# Patient Record
Sex: MALE | Race: ASIAN | Employment: STUDENT | URBAN - METROPOLITAN AREA
[De-identification: names, ages, dates, MRNs, and addresses within clinical notes are randomized per-mention and may not be internally consistent; named-entity substitution may affect disease eponyms.]

---

## 2019-12-24 ENCOUNTER — APPOINTMENT (OUTPATIENT)
Dept: CT IMAGING | Facility: CLINIC | Age: 29
End: 2019-12-24
Attending: FAMILY MEDICINE
Payer: COMMERCIAL

## 2019-12-24 ENCOUNTER — HOSPITAL ENCOUNTER (EMERGENCY)
Facility: CLINIC | Age: 29
Discharge: HOME | End: 2019-12-25
Attending: FAMILY MEDICINE | Admitting: FAMILY MEDICINE
Payer: COMMERCIAL

## 2019-12-24 DIAGNOSIS — R10.31 RLQ ABDOMINAL PAIN: ICD-10-CM

## 2019-12-24 LAB
ALBUMIN SERPL-MCNC: 4 G/DL (ref 3.4–5)
ALBUMIN UR-MCNC: NEGATIVE MG/DL
ALP SERPL-CCNC: 70 U/L (ref 40–150)
ALT SERPL W P-5'-P-CCNC: 27 U/L (ref 0–70)
ANION GAP SERPL CALCULATED.3IONS-SCNC: 2 MMOL/L (ref 3–14)
APPEARANCE UR: ABNORMAL
AST SERPL W P-5'-P-CCNC: 28 U/L (ref 0–45)
BASOPHILS # BLD AUTO: 0 10E9/L (ref 0–0.2)
BASOPHILS NFR BLD AUTO: 0.2 %
BILIRUB SERPL-MCNC: 0.5 MG/DL (ref 0.2–1.3)
BILIRUB UR QL STRIP: NEGATIVE
BUN SERPL-MCNC: 14 MG/DL (ref 7–30)
CALCIUM SERPL-MCNC: 8.9 MG/DL (ref 8.5–10.1)
CAOX CRY #/AREA URNS HPF: ABNORMAL /HPF
CHLORIDE SERPL-SCNC: 108 MMOL/L (ref 94–109)
CO2 SERPL-SCNC: 31 MMOL/L (ref 20–32)
COLOR UR AUTO: YELLOW
CREAT SERPL-MCNC: 1.14 MG/DL (ref 0.66–1.25)
DIFFERENTIAL METHOD BLD: ABNORMAL
EOSINOPHIL # BLD AUTO: 0.4 10E9/L (ref 0–0.7)
EOSINOPHIL NFR BLD AUTO: 2.9 %
ERYTHROCYTE [DISTWIDTH] IN BLOOD BY AUTOMATED COUNT: 12.3 % (ref 10–15)
GFR SERPL CREATININE-BSD FRML MDRD: 88 ML/MIN/{1.73_M2}
GLUCOSE SERPL-MCNC: 108 MG/DL (ref 70–99)
GLUCOSE UR STRIP-MCNC: NEGATIVE MG/DL
HCT VFR BLD AUTO: 48.5 % (ref 40–53)
HGB BLD-MCNC: 15.9 G/DL (ref 13.3–17.7)
HGB UR QL STRIP: NEGATIVE
IMM GRANULOCYTES # BLD: 0 10E9/L (ref 0–0.4)
IMM GRANULOCYTES NFR BLD: 0.2 %
KETONES UR STRIP-MCNC: NEGATIVE MG/DL
LEUKOCYTE ESTERASE UR QL STRIP: NEGATIVE
LIPASE SERPL-CCNC: 148 U/L (ref 73–393)
LYMPHOCYTES # BLD AUTO: 3.4 10E9/L (ref 0.8–5.3)
LYMPHOCYTES NFR BLD AUTO: 27 %
MCH RBC QN AUTO: 32.4 PG (ref 26.5–33)
MCHC RBC AUTO-ENTMCNC: 32.8 G/DL (ref 31.5–36.5)
MCV RBC AUTO: 99 FL (ref 78–100)
MONOCYTES # BLD AUTO: 0.8 10E9/L (ref 0–1.3)
MONOCYTES NFR BLD AUTO: 6.7 %
MUCOUS THREADS #/AREA URNS LPF: PRESENT /LPF
NEUTROPHILS # BLD AUTO: 7.9 10E9/L (ref 1.6–8.3)
NEUTROPHILS NFR BLD AUTO: 63 %
NITRATE UR QL: NEGATIVE
NRBC # BLD AUTO: 0 10*3/UL
NRBC BLD AUTO-RTO: 0 /100
PH UR STRIP: 6 PH (ref 5–7)
PLATELET # BLD AUTO: 223 10E9/L (ref 150–450)
POTASSIUM SERPL-SCNC: 4.1 MMOL/L (ref 3.4–5.3)
PROT SERPL-MCNC: 6.9 G/DL (ref 6.8–8.8)
RBC # BLD AUTO: 4.9 10E12/L (ref 4.4–5.9)
RBC #/AREA URNS AUTO: 3 /HPF (ref 0–2)
SODIUM SERPL-SCNC: 141 MMOL/L (ref 133–144)
SOURCE: ABNORMAL
SP GR UR STRIP: 1.02 (ref 1–1.03)
UROBILINOGEN UR STRIP-MCNC: NORMAL MG/DL (ref 0–2)
WBC # BLD AUTO: 12.5 10E9/L (ref 4–11)
WBC #/AREA URNS AUTO: 1 /HPF (ref 0–5)

## 2019-12-24 PROCEDURE — 83690 ASSAY OF LIPASE: CPT | Performed by: FAMILY MEDICINE

## 2019-12-24 PROCEDURE — 96361 HYDRATE IV INFUSION ADD-ON: CPT

## 2019-12-24 PROCEDURE — 85025 COMPLETE CBC W/AUTO DIFF WBC: CPT | Performed by: FAMILY MEDICINE

## 2019-12-24 PROCEDURE — 25800030 ZZH RX IP 258 OP 636: Performed by: FAMILY MEDICINE

## 2019-12-24 PROCEDURE — 80053 COMPREHEN METABOLIC PANEL: CPT | Performed by: FAMILY MEDICINE

## 2019-12-24 PROCEDURE — 74177 CT ABD & PELVIS W/CONTRAST: CPT

## 2019-12-24 PROCEDURE — 25000125 ZZHC RX 250: Performed by: FAMILY MEDICINE

## 2019-12-24 PROCEDURE — 96375 TX/PRO/DX INJ NEW DRUG ADDON: CPT

## 2019-12-24 PROCEDURE — 81001 URINALYSIS AUTO W/SCOPE: CPT | Performed by: FAMILY MEDICINE

## 2019-12-24 PROCEDURE — 25000128 H RX IP 250 OP 636: Performed by: FAMILY MEDICINE

## 2019-12-24 PROCEDURE — 99284 EMERGENCY DEPT VISIT MOD MDM: CPT | Mod: Z6 | Performed by: FAMILY MEDICINE

## 2019-12-24 PROCEDURE — 99285 EMERGENCY DEPT VISIT HI MDM: CPT | Mod: 25

## 2019-12-24 PROCEDURE — 96374 THER/PROPH/DIAG INJ IV PUSH: CPT | Mod: 59

## 2019-12-24 RX ORDER — HYDROMORPHONE HCL/0.9% NACL/PF 0.2MG/0.2
0.2 SYRINGE (ML) INTRAVENOUS ONCE
Status: COMPLETED | OUTPATIENT
Start: 2019-12-24 | End: 2019-12-25

## 2019-12-24 RX ORDER — SODIUM CHLORIDE 9 MG/ML
INJECTION, SOLUTION INTRAVENOUS ONCE
Status: COMPLETED | OUTPATIENT
Start: 2019-12-24 | End: 2019-12-24

## 2019-12-24 RX ORDER — IOPAMIDOL 755 MG/ML
100 INJECTION, SOLUTION INTRAVASCULAR ONCE
Status: COMPLETED | OUTPATIENT
Start: 2019-12-24 | End: 2019-12-24

## 2019-12-24 RX ADMIN — PROCHLORPERAZINE EDISYLATE 10 MG: 5 INJECTION INTRAMUSCULAR; INTRAVENOUS at 22:06

## 2019-12-24 RX ADMIN — SODIUM CHLORIDE: 9 INJECTION, SOLUTION INTRAVENOUS at 22:06

## 2019-12-24 RX ADMIN — SODIUM CHLORIDE 62 ML: 9 INJECTION, SOLUTION INTRAVENOUS at 23:40

## 2019-12-24 RX ADMIN — IOPAMIDOL 89 ML: 755 INJECTION, SOLUTION INTRAVENOUS at 23:37

## 2019-12-24 ASSESSMENT — MIFFLIN-ST. JEOR: SCORE: 1813.6

## 2019-12-24 NOTE — ED AVS SNAPSHOT
[facility], Emergency Department  [address]  [place] [address]  Phone:  [phone]  Fax:  [phone]                                    Mary Mcclain   MRN: [id number]    Department:  [facility], Emergency Department   Date of Visit:  12/24/2019           After Visit Summary Signature Page    I have received my discharge instructions, and my questions have been answered. I have discussed any challenges I see with this plan with the nurse or doctor.    ..........................................................................................................................................  Patient/Patient Representative Signature      ..........................................................................................................................................  Patient Representative Print Name and Relationship to Patient    ..................................................               ................................................  Date                                   Time    ..........................................................................................................................................  Reviewed by Signature/Title    ...................................................              ..............................................  Date                                               Time          22EPIC Rev 08/18

## 2019-12-25 VITALS
WEIGHT: 180 LBS | DIASTOLIC BLOOD PRESSURE: 79 MMHG | BODY MASS INDEX: 25.2 KG/M2 | TEMPERATURE: 97.5 F | HEIGHT: 71 IN | SYSTOLIC BLOOD PRESSURE: 123 MMHG | RESPIRATION RATE: 16 BRPM | HEART RATE: 75 BPM | OXYGEN SATURATION: 97 %

## 2019-12-25 PROCEDURE — 25000128 H RX IP 250 OP 636: Performed by: FAMILY MEDICINE

## 2019-12-25 RX ORDER — PROCHLORPERAZINE MALEATE 10 MG
10 TABLET ORAL EVERY 6 HOURS PRN
Qty: 6 TABLET | Refills: 0 | Status: SHIPPED | OUTPATIENT
Start: 2019-12-25

## 2019-12-25 RX ADMIN — Medication 0.2 MG: at 00:10

## 2019-12-25 ASSESSMENT — ENCOUNTER SYMPTOMS
CONSTIPATION: 0
HEMATOLOGIC/LYMPHATIC NEGATIVE: 1
NAUSEA: 0
SHORTNESS OF BREATH: 0
FEVER: 0
DIAPHORESIS: 0
DYSURIA: 0
MYALGIAS: 0
CHILLS: 0
FLANK PAIN: 0
SORE THROAT: 0
DIARRHEA: 0
COUGH: 0
VOMITING: 0
ABDOMINAL PAIN: 1
FREQUENCY: 0
FATIGUE: 0

## 2019-12-25 NOTE — ED PROVIDER NOTES
"    [facility] EMERGENCY DEPARTMENT ([facility])    12/24/19       History     Chief Complaint   Patient presents with     Abdominal Pain     generalized abdominal cramping which started today around 10 am, + nausea, no vomiting     HPI  Mary Mcclain is a 29 year old male who presents to the Emergency Department for abdominal cramping/ pain diffuse and nausea. This began today. No n or v. No f or c. No dysuria, no diarrhea. He has eaten today. Pt reports no hx of constipation. Last evening he ate \"[place] food\"- friend not ill today.    I have reviewed the Medications, Allergies, Past Medical and Surgical History, and Social History in the Epic system.  Past hx is benign. There is one intoxication from etoh in records  He reports no medications  Denies street drugs and no recent etoh  No tobacco    History reviewed. No pertinent past medical history.    History reviewed. No pertinent surgical history.    History reviewed. No pertinent family history.    Social History     Tobacco Use     Smoking status: Never Smoker     Smokeless tobacco: Never Used   Substance Use Topics     Alcohol use: Not Currently     Comment: last 2 months       No current facility-administered medications for this encounter.      Current Outpatient Medications   Medication     prochlorperazine (COMPAZINE) 10 MG tablet      No Known Allergies     Review of Systems   Constitutional: Negative for chills, diaphoresis, fatigue and fever.   HENT: Negative for congestion and sore throat.    Respiratory: Negative for cough and shortness of breath.    Cardiovascular: Negative for chest pain.   Gastrointestinal: Positive for abdominal pain. Negative for constipation, diarrhea, nausea and vomiting.   Genitourinary: Negative for dysuria, flank pain, frequency, scrotal swelling and testicular pain.   Musculoskeletal: Negative for myalgias.   Skin: Negative.    Hematological: Negative.        Physical Exam   BP: 126/78  Pulse: 62  Temp: 97.5 " " F (36.4  C)  Resp: 18  Height: 180.3 cm (5' 11\")  Weight: 81.6 kg (180 lb)  SpO2: 100 %      Physical Exam  Vitals signs and nursing note reviewed.   Constitutional:       General: He is not in acute distress.     Appearance: He is well-developed. He is not ill-appearing, toxic-appearing or diaphoretic.   HENT:      Head: Normocephalic and atraumatic.   Cardiovascular:      Rate and Rhythm: Normal rate and regular rhythm.      Heart sounds: Normal heart sounds.   Pulmonary:      Effort: Pulmonary effort is normal. No respiratory distress.      Breath sounds: Normal breath sounds.   Abdominal:      Comments: The pt has a non surgical abd  There is diffuse tenderness greater lower the high and greater right than left  No hs ilan   Skin:     General: Skin is warm and dry.   Neurological:      General: No focal deficit present.      Mental Status: He is alert and oriented to person, place, and time.   Psychiatric:         Behavior: Behavior normal.         ED Course        Procedures        The pt got compazine IV. This allowed for sleep. On awakening- same exam and  Complaints. Small amount of dilaudid then used  The WBC was minimally elevated. Chemistries are normal  UA normal  I elected to obtain CT looking for appy\  CT is normal showing no cause for pain. No appy! There is moderate amount of stool.  Immediately before discharge exam is improved with less discomfort.    Will discharge pt with compazine and instructions to return in 12 hours if not fully better. This is a possible early appy - no changes on ct.  Constipation is also a possibility    Recheck in 12 hours if not significantly better.  Clear liquids incase return needed    At this point  Labs Ordered and Resulted from Time of ED Arrival Up to the Time of Departure from the ED   CBC WITH PLATELETS DIFFERENTIAL - Abnormal; Notable for the following components:       Result Value    WBC 12.5 (*)     All other components within normal limits   COMPREHENSIVE " METABOLIC PANEL - Abnormal; Notable for the following components:    Anion Gap 2 (*)     Glucose 108 (*)     All other components within normal limits   UA MACROSCOPIC WITH REFLEX TO MICRO AND CULTURE - Abnormal; Notable for the following components:    RBC Urine 3 (*)     Mucous Urine Present (*)     Calcium Oxalate Few (*)     All other components within normal limits   LIPASE            Assessments & Plan (with Medical Decision Making)       I have reviewed the nursing notes.    I have reviewed the findings, diagnosis, plan and need for follow up with the patient.    Discharge Medication List as of 12/25/2019 12:45 AM      START taking these medications    Details   prochlorperazine (COMPAZINE) 10 MG tablet Take 1 tablet (10 mg) by mouth every 6 hours as needed for nausea or vomiting (and to help relax), Disp-6 tablet, R-0, Local Print             Final diagnoses:   RLQ abdominal pain       12/24/2019   [facility], [place], EMERGENCY DEPARTMENT     Collin Hendrickson MD  12/25/19 2322

## 2019-12-25 NOTE — DISCHARGE INSTRUCTIONS
Your ct scan looks normal. There is a moderate amount of stool in your colon  Your blood work is not remarkable  I do not have a cause for your abdominal pain.  Tonight and tomorrow- clear liquids only- water, gator aid or 7 up.   For the pain, 2 advil every 4 hours or two 325 mg tylenol every 6 hours  If needed to help relax or nausea compazine  If not 100% better in 12 hours return for a recheck to the emergency room  If 100 % better- begin a dose of milk of magnesia each day for 3 days- 1 oz daily- this to help move the stool- it is possible your pain is stool related    If fevers greater than 100.5, vomiting or worse pain return asap before 12 hours

## 2020-03-11 ENCOUNTER — OFFICE VISIT (OUTPATIENT)
Dept: ORTHOPEDICS | Facility: CLINIC | Age: 30
End: 2020-03-11
Payer: COMMERCIAL

## 2020-03-11 VITALS — HEIGHT: 71 IN | WEIGHT: 180 LBS | BODY MASS INDEX: 25.2 KG/M2

## 2020-03-11 DIAGNOSIS — M89.8X1 PERISCAPULAR PAIN: Primary | ICD-10-CM

## 2020-03-11 RX ORDER — TIZANIDINE 2 MG/1
2-4 TABLET ORAL 3 TIMES DAILY PRN
Qty: 30 TABLET | Refills: 0 | Status: SHIPPED | OUTPATIENT
Start: 2020-03-11

## 2020-03-11 ASSESSMENT — MIFFLIN-ST. JEOR: SCORE: 1813.6

## 2020-03-11 NOTE — PROGRESS NOTES
SPORTS & ORTHOPEDIC WALK-IN VISIT 3/11/2020    Primary Care Physician:      A few days ago he was doing a rowing exercise and started to feel pain in upper back/neck on left side. He has difficulty and pain looking to the left. He said that the pain seems to continue to get worse. For the first 2 days he had shooting pain and stiffness down the left arm. The pain is worse in the mornings and seems to get better as the day goes on.     Reason for visit:     What part of your body is injured / painful?  left shoulder    What caused the injury /pain? Unsure    How long ago did your injury occur or pain begin? 3/8/20    What are your most bothersome symptoms? Pain    How would you characterize your symptom?  throbing    What makes your symptoms better? Nothing    What makes your symptoms worse? Movement    Have you been previously seen for this problem? No    Medical History:    Any recent changes to your medical history? No    Any new medication prescribed since last visit? No    Have you had surgery on this body part before? No    Social History:    Occupation: Student    Handedness: Right    Exercise: 4-5 days/week    Review of Systems:    Do you have fever, chills, weight loss? No    Do you have any vision problems? No    Do you have any chest pain or edema? No    Do you have any shortness of breath or wheezing?  No    Do you have stomach problems? No    Do you have any numbness or focal weakness? No    Do you have diabetes? No    Do you have problems with bleeding or clotting? No    Do you have an rashes or other skin lesions? No

## 2020-03-11 NOTE — LETTER
3/11/2020       RE: Mary Mcclain  [address]  [facility] [place] [facility] [address]     Dear Colleague,    Thank you for referring your patient, Mary Mcclain, to the [facility] SPORTS AND ORTHOPAEDIC WALK IN CLINIC at [facility]. Please see a copy of my visit note below.          SPORTS & ORTHOPEDIC WALK-IN VISIT 3/11/2020    Primary Care Physician:      A few days ago he was doing a rowing exercise and started to feel pain in upper back/neck on left side. He has difficulty and pain looking to the left. He said that the pain seems to continue to get worse. For the first 2 days he had shooting pain and stiffness down the left arm. The pain is worse in the mornings and seems to get better as the day goes on.     Reason for visit:     What part of your body is injured / painful?  left shoulder    What caused the injury /pain? Unsure    How long ago did your injury occur or pain begin? 3/8/20    What are your most bothersome symptoms? Pain    How would you characterize your symptom?  throbing    What makes your symptoms better? Nothing    What makes your symptoms worse? Movement    Have you been previously seen for this problem? No    Medical History:    Any recent changes to your medical history? No    Any new medication prescribed since last visit? No    Have you had surgery on this body part before? No    Social History:    Occupation: Student    Handedness: Right    Exercise: 4-5 days/week    Review of Systems:    Do you have fever, chills, weight loss? No    Do you have any vision problems? No    Do you have any chest pain or edema? No    Do you have any shortness of breath or wheezing?  No    Do you have stomach problems? No    Do you have any numbness or focal weakness? No    Do you have diabetes? No    Do you have problems with bleeding or clotting? No    Do you have an rashes or other skin lesions? No           [facility]  Orthopedics  Joe Redmond,  "MD  2020     Name: Mary Mcclain  MRN: [id number]  Age: 29 year old  : 1990  Referring provider: Referred Self     Chief Complaint: Pain of the Left Shoulder      Date of Injury: 3/8/20    History of Present Illness:   Mary Mcclain is a 29 year old male who presents today for evaluation of left upper back pain. He reports an onset of left-sided upper back/neck pain that began a few days ago after performing a rowing exercise. Since then, he states that his pain has worsened and he has had difficulty and pain looking to the left. For the first 2 days he had shooting pain and stiffness down the left arm, but this has since resolved. The pain is worse in the mornings and seems to get better as the day goes on. He also endorses pain at night when trying to sleep. He has tried stretching and tylenol to alleviate his pain.      Review of Systems:   A 10-point review of systems was obtained and is negative except for as noted in the HPI.     Medications:     Current Outpatient Medications:      prochlorperazine (COMPAZINE) 10 MG tablet, Take 1 tablet (10 mg) by mouth every 6 hours as needed for nausea or vomiting (and to help relax), Disp: 6 tablet, Rfl: 0    Allergies:  The patient reports no known allergies.    Past Medical History:  The patient does not have any pertinent past medical history.    Past Surgical History:  The patient does not have any pertinent past surgical history.    Social History:  The patient denies tobacco or alcohol use.    Family History:  No past pertinent family history.    Physical Examination:  Height 1.803 m (5' 11\"), weight 81.6 kg (180 lb).  General: alert, pleasant, no distress  Neck/Spine: no TTP of spinous processes in cervical spine. Full ROM with flexion, extension, rotation, tilting  with pain with rotating head to left. negative TTP along paraspinous muscles and upper trapezius musculature. positive  Periscapular pain.  positive  tightness in this " area. No noted bruising or skin discoloration. Spurlings negative  Neurologic: SILT throughout upper extremities. Strength 5/5 and symmetric throughout upper extremities.   Upper Extremities: warm, well perfused, no edema. Full ROM without pain in shoulder, elbow, wrist, and hand. Good capillary refill bilaterally        Assessment:   29 year old male with a left trapezius muscle strain and periscapular pain    Plan:   9. Will provide patient with scapular mobility home exercises to start. He may consider a course of physical therapy.   10. I recommend he treat his pain with NSAIDs, ice, and a trigger point massage.  11. Tizanidine for symptomatic relief.  12. Follow-up prn.     Joe Redmond MD    Scribe Disclosure:  I, Cody Crisostomo, am serving as a scribe to document services personally performed by Joe Redmond MD at this visit, based upon the provider's statements to me. All documentation has been reviewed by the aforementioned provider prior to being entered into the official medical record.

## 2020-03-11 NOTE — PROGRESS NOTES
"[facility]  Orthopedics  Joe Redmond MD  2020     Name: Mary Mcclain  MRN: [id number]  Age: 29 year old  : 1990  Referring provider: Referred Self     Chief Complaint: Pain of the Left Shoulder      Date of Injury: 3/8/20    History of Present Illness:   Mary Mcclain is a 29 year old male who presents today for evaluation of left upper back pain. He reports an onset of left-sided upper back/neck pain that began a few days ago after performing a rowing exercise. Since then, he states that his pain has worsened and he has had difficulty and pain looking to the left. For the first 2 days he had shooting pain and stiffness down the left arm, but this has since resolved. The pain is worse in the mornings and seems to get better as the day goes on. He also endorses pain at night when trying to sleep. He has tried stretching and tylenol to alleviate his pain.      Review of Systems:   A 10-point review of systems was obtained and is negative except for as noted in the HPI.     Medications:     Current Outpatient Medications:      prochlorperazine (COMPAZINE) 10 MG tablet, Take 1 tablet (10 mg) by mouth every 6 hours as needed for nausea or vomiting (and to help relax), Disp: 6 tablet, Rfl: 0    Allergies:  The patient reports no known allergies.    Past Medical History:  The patient does not have any pertinent past medical history.    Past Surgical History:  The patient does not have any pertinent past surgical history.    Social History:  The patient denies tobacco or alcohol use.    Family History:  No past pertinent family history.    Physical Examination:  Height 1.803 m (5' 11\"), weight 81.6 kg (180 lb).  General: alert, pleasant, no distress  Neck/Spine: no TTP of spinous processes in cervical spine. Full ROM with flexion, extension, rotation, tilting  with pain with rotating head to left. negative TTP along paraspinous muscles and upper trapezius musculature. positive  " Periscapular pain.  positive  tightness in this area. No noted bruising or skin discoloration. Spurlings negative  Neurologic: SILT throughout upper extremities. Strength 5/5 and symmetric throughout upper extremities.   Upper Extremities: warm, well perfused, no edema. Full ROM without pain in shoulder, elbow, wrist, and hand. Good capillary refill bilaterally        Assessment:   29 year old male with a left trapezius muscle strain and periscapular pain    Plan:   1. Will provide patient with scapular mobility home exercises to start. He may consider a course of physical therapy.   2. I recommend he treat his pain with NSAIDs, ice, and a trigger point massage.  3. Tizanidine for symptomatic relief.  4. Follow-up prn.     Joe Redmond MD    Scribe Disclosure:  I, Cody Crisostomo, am serving as a scribe to document services personally performed by Joe Redmond MD at this visit, based upon the provider's statements to me. All documentation has been reviewed by the aforementioned provider prior to being entered into the official medical record.